# Patient Record
Sex: FEMALE | Race: WHITE | NOT HISPANIC OR LATINO | Employment: OTHER | URBAN - NONMETROPOLITAN AREA
[De-identification: names, ages, dates, MRNs, and addresses within clinical notes are randomized per-mention and may not be internally consistent; named-entity substitution may affect disease eponyms.]

---

## 2024-08-03 ENCOUNTER — HOSPITAL ENCOUNTER (EMERGENCY)
Facility: HOSPITAL | Age: 49
Discharge: HOME | End: 2024-08-03
Attending: EMERGENCY MEDICINE
Payer: MEDICARE

## 2024-08-03 ENCOUNTER — APPOINTMENT (OUTPATIENT)
Dept: RADIOLOGY | Facility: HOSPITAL | Age: 49
End: 2024-08-03
Payer: MEDICARE

## 2024-08-03 VITALS
DIASTOLIC BLOOD PRESSURE: 68 MMHG | OXYGEN SATURATION: 96 % | HEIGHT: 64 IN | WEIGHT: 272 LBS | TEMPERATURE: 98 F | RESPIRATION RATE: 12 BRPM | SYSTOLIC BLOOD PRESSURE: 104 MMHG | BODY MASS INDEX: 46.44 KG/M2 | HEART RATE: 85 BPM

## 2024-08-03 DIAGNOSIS — S49.91XA ARM INJURY, RIGHT, INITIAL ENCOUNTER: ICD-10-CM
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
## 2024-08-03 DIAGNOSIS — S53.104A DISLOCATION OF RIGHT ELBOW, INITIAL ENCOUNTER: Primary | ICD-10-CM

## 2024-08-03 PROCEDURE — 96375 TX/PRO/DX INJ NEW DRUG ADDON: CPT | Mod: 59

## 2024-08-03 PROCEDURE — 2500000001 HC RX 250 WO HCPCS SELF ADMINISTERED DRUGS (ALT 637 FOR MEDICARE OP): Performed by: EMERGENCY MEDICINE

## 2024-08-03 PROCEDURE — 73110 X-RAY EXAM OF WRIST: CPT | Mod: RT

## 2024-08-03 PROCEDURE — 94681 O2 UPTK CO2 OUTP % O2 XTRC: CPT

## 2024-08-03 PROCEDURE — 2500000005 HC RX 250 GENERAL PHARMACY W/O HCPCS: Performed by: EMERGENCY MEDICINE

## 2024-08-03 PROCEDURE — 99285 EMERGENCY DEPT VISIT HI MDM: CPT | Mod: 25

## 2024-08-03 PROCEDURE — 73070 X-RAY EXAM OF ELBOW: CPT | Mod: RIGHT SIDE | Performed by: RADIOLOGY

## 2024-08-03 PROCEDURE — 73070 X-RAY EXAM OF ELBOW: CPT | Mod: RT

## 2024-08-03 PROCEDURE — 73110 X-RAY EXAM OF WRIST: CPT | Mod: RIGHT SIDE | Performed by: RADIOLOGY

## 2024-08-03 PROCEDURE — 73080 X-RAY EXAM OF ELBOW: CPT | Mod: RIGHT SIDE | Performed by: RADIOLOGY

## 2024-08-03 PROCEDURE — 99152 MOD SED SAME PHYS/QHP 5/>YRS: CPT | Performed by: EMERGENCY MEDICINE

## 2024-08-03 PROCEDURE — 24600 TX CLSD ELBOW DISLC W/O ANES: CPT | Mod: RT

## 2024-08-03 PROCEDURE — 96376 TX/PRO/DX INJ SAME DRUG ADON: CPT | Mod: 59

## 2024-08-03 PROCEDURE — 2500000004 HC RX 250 GENERAL PHARMACY W/ HCPCS (ALT 636 FOR OP/ED): Performed by: EMERGENCY MEDICINE

## 2024-08-03 PROCEDURE — 73080 X-RAY EXAM OF ELBOW: CPT | Mod: RT

## 2024-08-03 PROCEDURE — 73030 X-RAY EXAM OF SHOULDER: CPT | Mod: RIGHT SIDE | Performed by: RADIOLOGY

## 2024-08-03 PROCEDURE — 96361 HYDRATE IV INFUSION ADD-ON: CPT | Mod: 59

## 2024-08-03 PROCEDURE — 96374 THER/PROPH/DIAG INJ IV PUSH: CPT | Mod: 59

## 2024-08-03 PROCEDURE — 73030 X-RAY EXAM OF SHOULDER: CPT | Mod: RT

## 2024-08-03 RX ORDER — IBUPROFEN 600 MG/1
600 TABLET ORAL EVERY 8 HOURS PRN
Qty: 30 TABLET | Refills: 0 | Status: SHIPPED | OUTPATIENT
Start: 2024-08-03

## 2024-08-03 RX ORDER — FENTANYL CITRATE 50 UG/ML
50 INJECTION, SOLUTION INTRAMUSCULAR; INTRAVENOUS ONCE
Status: DISCONTINUED | OUTPATIENT
Start: 2024-08-03 | End: 2024-08-03

## 2024-08-03 RX ORDER — FENTANYL CITRATE 50 UG/ML
50 INJECTION, SOLUTION INTRAMUSCULAR; INTRAVENOUS ONCE
Status: COMPLETED | OUTPATIENT
Start: 2024-08-03 | End: 2024-08-03

## 2024-08-03 RX ORDER — HYDROCODONE BITARTRATE AND ACETAMINOPHEN 5; 325 MG/1; MG/1
1 TABLET ORAL EVERY 4 HOURS PRN
Qty: 18 TABLET | Refills: 0 | Status: SHIPPED | OUTPATIENT
Start: 2024-08-03 | End: 2024-08-06

## 2024-08-03 RX ORDER — PROPOFOL 10 MG/ML
INJECTION, EMULSION INTRAVENOUS CODE/TRAUMA/SEDATION MEDICATION
Status: COMPLETED | OUTPATIENT
Start: 2024-08-03 | End: 2024-08-03

## 2024-08-03 RX ORDER — KETOROLAC TROMETHAMINE 15 MG/ML
15 INJECTION, SOLUTION INTRAMUSCULAR; INTRAVENOUS ONCE
Status: COMPLETED | OUTPATIENT
Start: 2024-08-03 | End: 2024-08-03

## 2024-08-03 RX ORDER — OXYCODONE HYDROCHLORIDE 5 MG/1
5 TABLET ORAL ONCE
Status: COMPLETED | OUTPATIENT
Start: 2024-08-03 | End: 2024-08-03

## 2024-08-03 RX ORDER — ONDANSETRON HYDROCHLORIDE 2 MG/ML
4 INJECTION, SOLUTION INTRAVENOUS ONCE
Status: COMPLETED | OUTPATIENT
Start: 2024-08-03 | End: 2024-08-03

## 2024-08-03 RX ORDER — FENTANYL CITRATE 50 UG/ML
INJECTION, SOLUTION INTRAMUSCULAR; INTRAVENOUS CODE/TRAUMA/SEDATION MEDICATION
Status: COMPLETED | OUTPATIENT
Start: 2024-08-03 | End: 2024-08-03

## 2024-08-03 RX ORDER — FENTANYL CITRATE 50 UG/ML
INJECTION, SOLUTION INTRAMUSCULAR; INTRAVENOUS
Status: DISCONTINUED
Start: 2024-08-03 | End: 2024-08-04 | Stop reason: HOSPADM

## 2024-08-03 RX ORDER — PROPOFOL 10 MG/ML
INJECTION, EMULSION INTRAVENOUS
Status: DISCONTINUED
Start: 2024-08-03 | End: 2024-08-04 | Stop reason: HOSPADM

## 2024-08-03 RX ADMIN — KETOROLAC TROMETHAMINE 15 MG: 15 INJECTION INTRAMUSCULAR; INTRAVENOUS at 22:46

## 2024-08-03 RX ADMIN — PROPOFOL 20 MG: 10 INJECTION, EMULSION INTRAVENOUS at 21:41

## 2024-08-03 RX ADMIN — ONDANSETRON 4 MG: 2 INJECTION INTRAMUSCULAR; INTRAVENOUS at 21:05

## 2024-08-03 RX ADMIN — SODIUM CHLORIDE 1000 ML: 9 INJECTION, SOLUTION INTRAVENOUS at 21:05

## 2024-08-03 RX ADMIN — FENTANYL CITRATE 50 MCG: 50 INJECTION, SOLUTION INTRAMUSCULAR; INTRAVENOUS at 21:37

## 2024-08-03 RX ADMIN — Medication 2 L/MIN: at 21:05

## 2024-08-03 RX ADMIN — PROPOFOL 40 MG: 10 INJECTION, EMULSION INTRAVENOUS at 21:38

## 2024-08-03 RX ADMIN — FENTANYL CITRATE 50 MCG: 0.05 INJECTION, SOLUTION INTRAMUSCULAR; INTRAVENOUS at 20:01

## 2024-08-03 RX ADMIN — OXYCODONE HYDROCHLORIDE 5 MG: 5 TABLET ORAL at 22:46

## 2024-08-03 RX ADMIN — FENTANYL CITRATE 50 MCG: 0.05 INJECTION, SOLUTION INTRAMUSCULAR; INTRAVENOUS at 21:03

## 2024-08-03 RX ADMIN — Medication 2 L/MIN: at 21:20

## 2024-08-03 RX ADMIN — PROPOFOL 40 MG: 10 INJECTION, EMULSION INTRAVENOUS at 21:41

## 2024-08-03 ASSESSMENT — PAIN SCALES - GENERAL
PAINLEVEL_OUTOF10: 8
PAINLEVEL_OUTOF10: 3
PAINLEVEL_OUTOF10: 5 - MODERATE PAIN

## 2024-08-03 ASSESSMENT — PAIN - FUNCTIONAL ASSESSMENT: PAIN_FUNCTIONAL_ASSESSMENT: 0-10

## 2024-08-03 ASSESSMENT — COLUMBIA-SUICIDE SEVERITY RATING SCALE - C-SSRS
6. HAVE YOU EVER DONE ANYTHING, STARTED TO DO ANYTHING, OR PREPARED TO DO ANYTHING TO END YOUR LIFE?: NO
1. IN THE PAST MONTH, HAVE YOU WISHED YOU WERE DEAD OR WISHED YOU COULD GO TO SLEEP AND NOT WAKE UP?: NO
2. HAVE YOU ACTUALLY HAD ANY THOUGHTS OF KILLING YOURSELF?: NO

## 2024-08-03 ASSESSMENT — PAIN DESCRIPTION - LOCATION: LOCATION: ARM

## 2024-08-03 ASSESSMENT — PAIN DESCRIPTION - PAIN TYPE: TYPE: ACUTE PAIN

## 2024-08-03 NOTE — ED PROVIDER NOTES
Department of Emergency Medicine   ED  Provider Note  Admit Date/RoomTime: 8/3/2024  7:33 PM  ED Room: 01/01                  History of Present Illness:   Malika Urbina is a 49 y.o. female presenting to the ED for right elbow/forearm pain, beginning just prior to arrival..  The complaint has been persistent, severe in severity, and worsened by  movement .  Patient complains of pain in the right elbow and forearm after she fell.  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  She lost her balance and fell onto her right arm.  Numbness or tingling.  She denies any head injury.  No other associated injury/complaint.   She is right hand dominant.      Review of Systems:   Pertinent positives and review of systems as noted above.  Remaining 10 review of systems is negative or noncontributory to today's episode of care.  Review of Systems   The review of systems is otherwise    --------------------------------------------- PAST HISTORY ---------------------------------------------  Past Medical History:  has no past medical history on file.    Past Surgical History:  has no past surgical history on file.    Social History:  reports that she has never smoked. She uses smokeless tobacco. She reports that she does not drink alcohol and does not use drugs.    Family History: family history is not on file. Unless otherwise noted, family history is non contributory    Discharge Medication List as of 8/3/2024 11:05 PM        START taking these medications    Details   HYDROcodone-acetaminophen (Norco) 5-325 mg tablet Take 1 tablet by mouth every 4 hours if needed for moderate pain (4 - 6) or severe pain (7 - 10) for up to 3 days., Starting Sat 8/3/2024, Until Tue 8/6/2024 at 2359, Normal      ibuprofen 600 mg tablet Take 1 tablet (600 mg) by mouth every 8 hours if needed for mild pain (1 - 3) or fever (temp greater than 38.0 C)., Starting Sat 8/3/2024, Normal           "  The patient’s home medications have been reviewed.    Allergies: Levaquin [levofloxacin]    -------------------------------------------------- RESULTS -------------------------------------------------  All laboratory and radiology results have been personally reviewed by myself   LABS:  Labs Reviewed - No data to display      RADIOLOGY:  Interpreted by Radiologist.  XR elbow right 1-2 views   Final Result   1. Interval reduction of elbow dislocation.   2. Previously described possible fractures of the radial head and   coronoid process are not definitively depicted on this limited   two-view examination.        Signed by: Theron Navarrete 8/3/2024 10:31 PM   Dictation workstation:   [id number]      XR wrist right 3+ views   Final Result   1. No acute fracture or malalignment of the right wrist.   2. Positive ulnar variance which can be associated with carpal   impaction.        MACRO:   None.        Signed by: Alicia Schilling 8/3/2024 8:44 PM   Dictation workstation:   [id number]      XR shoulder right 2+ views   Final Result   1. Moderate acromioclavicular joint osteoarthrosis.        MACRO:        None.        Signed by: Alicia Schilling 8/3/2024 8:43 PM   Dictation workstation:   [id number]      XR elbow right 3+ views   Final Result   1. Posterior displacement of the radius and ulna with respect to the   distal humerus.   2. No large fracture appreciated although small fractures of the   radial head and coronoid process are not entirely excluded.        MACRO:   None.        Signed by: Alicia Schilling 8/3/2024 8:43 PM   Dictation workstation:   [id number]          No results found for this or any previous visit (from the past 4464 hour(s)).  ------------------------- NURSING NOTES AND VITALS REVIEWED ---------------------------   The nursing notes within the ED encounter and vital signs as below have been reviewed.   /68   Pulse 85   Temp 36.7 °C (98 °F)   Resp 12   Ht 1.626 m (5' 4\")   Wt " 123 kg (272 lb)   SpO2 96%   BMI 46.69 kg/m²   Oxygen Saturation Interpretation: Normal      ---------------------------------------------------PHYSICAL EXAM--------------------------------------  Physical Exam  Vitals and nursing note reviewed.   Constitutional:       General: She is not in acute distress.     Appearance: She is well-developed. She is obese. She is not ill-appearing or toxic-appearing.   HENT:      Head: Normocephalic and atraumatic.      Nose: Nose normal.      Mouth/Throat:      Mouth: Mucous membranes are moist.      Pharynx: Oropharynx is clear.   Eyes:      General: No scleral icterus.     Conjunctiva/sclera: Conjunctivae normal.      Pupils: Pupils are equal, round, and reactive to light.   Cardiovascular:      Rate and Rhythm: Normal rate and regular rhythm.      Pulses: Normal pulses.      Heart sounds: Normal heart sounds. No murmur heard.  Pulmonary:      Effort: Pulmonary effort is normal. No respiratory distress.      Breath sounds: Normal breath sounds. No wheezing, rhonchi or rales.   Abdominal:      Palpations: Abdomen is soft.      Tenderness: There is no abdominal tenderness. There is no guarding or rebound.   Musculoskeletal:         General: Swelling, tenderness and deformity present. No signs of injury.      Cervical back: Normal range of motion and neck supple. No rigidity or tenderness.      Right lower leg: No edema.      Left lower leg: No edema.      Comments: There is appears to be deformity at the right elbow.  She came in an air splint some swelling along the no gross deformity of the right wrist or right hand.  Patient can move all her fingers.  Right hand is grossly neurovascularly intact.  She has some mild tenderness of the right shoulder but no mid humeral tenderness or deformity.   Skin:     General: Skin is warm and dry.      Capillary Refill: Capillary refill takes less than 2 seconds.      Findings: No bruising or rash.   Neurological:      Mental Status: She  is alert and oriented to person, place, and time.   Psychiatric:         Mood and Affect: Mood normal.            Moderate Sedation    Performed by: Foster Conway DO  Authorized by: Foster Conway DO    Consent:     Consent obtained:  Verbal    Consent given by:  Patient    Risks, benefits, and alternatives were discussed: yes      Risks discussed:  Allergic reaction, prolonged hypoxia resulting in organ damage, dysrhythmia, prolonged sedation necessitating reversal, inadequate sedation, respiratory compromise necessitating ventilatory assistance and intubation, nausea and vomiting    Alternatives discussed:  Analgesia without sedation  Universal protocol:     Procedure explained and questions answered to patient or proxy's satisfaction: yes      Patient identity confirmed:  Verbally with patient  Indications:     Procedure performed:  Dislocation reduction    Procedure necessitating sedation performed by:  Physician performing sedation    Intended level of sedation:  Moderate  Pre-sedation assessment:     NPO status caution: unable to specify NPO status and urgency dictates proceeding with non-ideal NPO status      ASA classification: class 2 - patient with mild systemic disease      Mouth opening:  3 or more finger widths    Thyromental distance:  3 finger widths    Mallampati score:  II - soft palate, uvula, fauces visible    Neck mobility: normal      Pre-sedation assessments completed and reviewed: airway patency, anesthesia/sedation history, cardiovascular function, hydration status, mental status, nausea/vomiting, pain level, respiratory function and temperature      History of difficult intubation: no    Immediate pre-procedure details:     Reassessment: Patient reassessed immediately prior to procedure      Reviewed: vital signs, relevant labs/tests and NPO status      Verified: bag valve mask available, emergency equipment available, intubation equipment available, IV patency confirmed, oxygen  available, reversal medications available and suction available    Procedure details (see MAR for exact dosages):     Sedation start time:  8/3/2024 9:40 PM    Preoxygenation:  Nasal cannula    Sedation:  Propofol    Analgesia:  Fentanyl    Intra-procedure monitoring:  Blood pressure monitoring, continuous capnometry, frequent LOC assessments, cardiac monitor, continuous pulse oximetry and frequent vital sign checks    Intra-procedure events: none      Total sedation time (minutes):  10  Post-procedure details:     Attendance: Constant attendance by certified staff until patient recovered      Recovery: Patient returned to pre-procedure baseline      Estimated blood loss (see I/O flowsheets): no      Post-sedation assessments completed and reviewed: airway patency, cardiovascular function, hydration status, mental status, nausea/vomiting, pain level, respiratory function and temperature      Specimens recovered:  None    Patient is stable for discharge or admission: yes      Procedure completion:  Tolerated well, no immediate complications  Comments:      Post reduction x-ray confirms adequate reduction of RIGHT ELBOW dislocaiton    none  ------------------------------ ED COURSE/MEDICAL DECISION MAKING----------------------    Medical Decision Making:   Patient was seen and examined by me.  X-rays of the right shoulder and elbow are examined wrist are obtained.  X-rays of the right elbow revealed posterior dislocation.    Postreduction x-rays reveal successful reduction of right elbow dislocation.    Nursing placed the patient in a posterior long-arm splint.  The splint was assessed by me.  Is in good position.  She has good circulation of the right hand and the right hand is neurovascularly intact.  The patient is placed in a sling by nursing.  Patient was reassessed after splint placement and this is appropriate.    Patient is discharged home in improved and stable condition to follow-up with orthopedics.  Patient  is given a referral to the on-call orthopedist Dr. Quinton Wood.      ED Course as of 08/04/24 0810   Sat Aug 03, 2024   2242 Right elbow x-ray postreduction  IMPRESSION:  1. Interval reduction of elbow dislocation.  2. Previously described possible fractures of the radial head and  coronoid process are not definitively depicted on this limited  two-view examination.   [EC]   2248 Right wrist  IMPRESSION:  1. No acute fracture or malalignment of the right wrist.  2. Positive ulnar variance which can be associated with carpal  impaction.   [EC]   2248 Right elbow prereduction  IMPRESSION:  1. Posterior displacement of the radius and ulna with respect to the  distal humerus.  2. No large fracture appreciated although small fractures of the  radial head and coronoid process are not entirely excluded.   [EC]   2248 Right shoulder  IMPRESSION:  1. Moderate acromioclavicular joint osteoarthrosis.   [EC]      ED Course User Index  [EC] Foster Conway DO         Diagnoses as of 08/04/24 0810   Dislocation of right elbow, initial encounter   Accidental fall, initial encounter   Arm injury, right, initial encounter      Counseling:   The emergency provider has spoken with the patient and discussed today’s results, in addition to providing specific details for the plan of care and counseling regarding the diagnosis and prognosis.  Questions are answered at this time and they are agreeable with the plan.      --------------------------------- IMPRESSION AND DISPOSITION ---------------------------------        IMPRESSION  1. Dislocation of right elbow, initial encounter    2. Accidental fall, initial encounter    3. Arm injury, right, initial encounter        DISPOSITION  Disposition: Discharge to home  Patient condition is fair      Billing Provider Critical Care Time: 0 minutes     Foster Conway DO  08/04/24 0810

## 2024-08-03 NOTE — ED TRIAGE NOTES
Pt to ED via [facility]  from home c/o R arm injury related to mechanical fall, deformity noted and the elbow and possible wrist, radial pulses strong and cap refill <3 seconds, pt arm stabilized by EMS and pt received 4mg IVP zofran and 100mcg IVP fentanyl en route

## 2024-08-04 NOTE — DISCHARGE INSTRUCTIONS
Rest ice elevation.  Leave splint on until recheck with orthopedics in the next 1 to 3 days.  Call for appointment.  Use sling as needed.  Take pain medications only as needed.  Follow-up with orthopedics in the next 1 to 3 days.